# Patient Record
(demographics unavailable — no encounter records)

---

## 2025-02-23 NOTE — HISTORY OF PRESENT ILLNESS
[Mother] : mother [Fruit] : fruit [Vegetables] : vegetables [Meat] : meat [Dairy] : dairy [Normal] : Normal [Sippy cup use] : Sippy cup use [Bottle Use] : Bottle use [Brushing teeth] : Brushing teeth [Yes] : Patient goes to dentist yearly [None] : Primary Fluoride Source: None [No] : Not at  exposure [Water heater temperature set at <120 degrees F] : Water heater temperature set at <120 degrees F [Car seat in back seat] : Car seat in back seat [Smoke Detectors] : Smoke detectors [Supervised play near cars and streets] : Supervised play near cars and streets [Carbon Monoxide Detectors] : Carbon monoxide detectors [Up to date] : Up to date [NO] : No [FreeTextEntry3] : sleeps with mother [< 2 hrs of screen time] : Less than 2 hrs of screen time [Exposure to electronic nicotine delivery system] : No exposure to electronic nicotine delivery system [FreeTextEntry7] : [address] [facility] Saw ophthalmologist 10/24 for left eye exotropia-observation for now- follow up in April 2025, discussed possible surgery [de-identified] : mother expressed behavioral concerns that child does not always follow directions well or listen to her and is very active and has difficulty sitting still, has not started toilet training but plans to since he will be going to school in the Fall, thought he had possible speech delays last year but in last year is talking well and no further concerns [FreeTextEntry9] : will attend pre- K in the Fall [de-identified] : drinks Nido 8 oz before bed, still has bottle before bed

## 2025-02-23 NOTE — HISTORY OF PRESENT ILLNESS
[Mother] : mother [Fruit] : fruit [Vegetables] : vegetables [Meat] : meat [Dairy] : dairy [Normal] : Normal [Sippy cup use] : Sippy cup use [Bottle Use] : Bottle use [Brushing teeth] : Brushing teeth [Yes] : Patient goes to dentist yearly [None] : Primary Fluoride Source: None [No] : Not at  exposure [Water heater temperature set at <120 degrees F] : Water heater temperature set at <120 degrees F [Car seat in back seat] : Car seat in back seat [Smoke Detectors] : Smoke detectors [Supervised play near cars and streets] : Supervised play near cars and streets [Carbon Monoxide Detectors] : Carbon monoxide detectors [Up to date] : Up to date [NO] : No [FreeTextEntry3] : sleeps with mother [< 2 hrs of screen time] : Less than 2 hrs of screen time [Exposure to electronic nicotine delivery system] : No exposure to electronic nicotine delivery system [FreeTextEntry7] : 3 YR [facility] Saw ophthalmologist 10/24 for left eye exotropia-observation for now- follow up in April 2025, discussed possible surgery [de-identified] : mother expressed behavioral concerns that child does not always follow directions well or listen to her and is very active and has difficulty sitting still, has not started toilet training but plans to since he will be going to school in the Fall, thought he had possible speech delays last year but in last year is talking well and no further concerns [de-identified] : drinks Nido 8 oz before bed, still has bottle before bed [FreeTextEntry9] : will attend pre- K in the Fall

## 2025-02-23 NOTE — DEVELOPMENTAL MILESTONES
[Plays and shares with others] : plays and shares with others [Put on coat, jacket, or shirt by self] : puts on coat, jacket, or shirt by self [Eats independently] : eats independently [Uses 3-word sentences] : uses 3-word sentences [Uses words that are 75% intelligible] : uses words that are 75% intelligible to strangers [Understands simple prepositions] : understands simple prepositions [Climbs on and off couch] : climbs on and off couch or chair [Jumps forward] : jumps forward [Draws a single [place]] : draws a single [place] [Yes: _______] : yes, [unfilled] [Goes to the bathroom and urinates] : does not go to bathroom and urinates by self [Tells a story from a book or TV] : does not tell a story from a book or TV [Compares things using words such] : does not compare things using words such as bigger or shorter [Draws a person with head] : does not draw a person with head and one other body part [Cuts with child scissor] : does not cut with child scissor [FreeTextEntry1] : [place] not fully completed- possible fine motor delay, child not cooperative with questions on [place], mother states he can answer them when he wants to

## 2025-02-23 NOTE — HISTORY OF PRESENT ILLNESS
[Mother] : mother [Fruit] : fruit [Vegetables] : vegetables [Meat] : meat [Dairy] : dairy [Normal] : Normal [Sippy cup use] : Sippy cup use [Bottle Use] : Bottle use [Brushing teeth] : Brushing teeth [Yes] : Patient goes to dentist yearly [None] : Primary Fluoride Source: None [No] : Not at  exposure [Water heater temperature set at <120 degrees F] : Water heater temperature set at <120 degrees F [Car seat in back seat] : Car seat in back seat [Smoke Detectors] : Smoke detectors [Supervised play near cars and streets] : Supervised play near cars and streets [Carbon Monoxide Detectors] : Carbon monoxide detectors [Up to date] : Up to date [NO] : No [FreeTextEntry3] : sleeps with mother [< 2 hrs of screen time] : Less than 2 hrs of screen time [Exposure to electronic nicotine delivery system] : No exposure to electronic nicotine delivery system [FreeTextEntry7] : 3 YR [facility] Saw ophthalmologist 10/24 for left eye exotropia-observation for now- follow up in April 2025, discussed possible surgery [de-identified] : mother expressed behavioral concerns that child does not always follow directions well or listen to her and is very active and has difficulty sitting still, has not started toilet training but plans to since he will be going to school in the Fall, thought he had possible speech delays last year but in last year is talking well and no further concerns [de-identified] : drinks Nido 8 oz before bed, still has bottle before bed [FreeTextEntry9] : will attend pre- K in the Fall

## 2025-02-23 NOTE — DEVELOPMENTAL MILESTONES
[Plays and shares with others] : plays and shares with others [Put on coat, jacket, or shirt by self] : puts on coat, jacket, or shirt by self [Eats independently] : eats independently [Uses 3-word sentences] : uses 3-word sentences [Uses words that are 75% intelligible] : uses words that are 75% intelligible to strangers [Understands simple prepositions] : understands simple prepositions [Climbs on and off couch] : climbs on and off couch or chair [Jumps forward] : jumps forward [Draws a single [place]] : draws a single [place] [Yes: _______] : yes, [unfilled] [Goes to the bathroom and urinates] : does not go to bathroom and urinates by self [Tells a story from a book or TV] : does not tell a story from a book or TV [Compares things using words such] : does not compare things using words such as bigger or shorter [Draws a person with head] : does not draw a person with head and one other body part [Cuts with child scissor] : does not cut with child scissor [FreeTextEntry1] : [place] not fully completed- possible fine motor delay, child not cooperative with questions on [place], mother states he can answer them when he wants to

## 2025-02-23 NOTE — DISCUSSION/SUMMARY
[Normal Growth] : growth [None] : No known medical problems [No Elimination Concerns] : elimination [No Feeding Concerns] : feeding [No Skin Concerns] : skin [Normal Sleep Pattern] : sleep [Family Support] : family support [Encouraging Literacy Activities] : encouraging literacy activities [Playing with Peers] : playing with peers [Promoting Physical Activity] : promoting physical activity [Safety] : safety [Mother] : mother [FreeTextEntry1] : Continue balanced diet with all food groups. Brush teeth twice a day with toothbrush. Recommend visit to dentist. As per car seat 's guidelines, use forward-facing car seat in back seat of car. Switch to booster seat when child reaches highest weight/height for seat. Child needs to ride in a belt-positioning booster seat until  4 feet 9 inches has been reached and are between 8 and 12 years of age. Put toddler to sleep in own bed. Help toddler to maintain consistent daily routines and sleep schedule. Pre-K discussed. Ensure home is safe. Use consistent, positive discipline. Read aloud to toddler. Limit screen time to no more than 2 hours per day.  Vaccines UTD- flu vaccine declined  Lead exposure risk assessment reviewed  Oral health assessment reviewed  Uncooperative for OAE and vision screening  Discussed with mother behavioral concerns and developmental concerns (still has bottle before bed, no interest in toilet training, poor eye contact, fine motor skills) advised referral to neurologist for further evaluation of possible developmental issues, mother does not wish to pursue neurology evaluation and states child "is just having a bad day" Discussed referral to , [facility] for evaluation and mother agreed to meet with her and if she determines neurology evaluation is necessary then she have him seen by neurology  Return 1 year for routine well child check or sooner if any concerns

## 2025-02-23 NOTE — DEVELOPMENTAL MILESTONES
[Plays and shares with others] : plays and shares with others [Put on coat, jacket, or shirt by self] : puts on coat, jacket, or shirt by self [Eats independently] : eats independently [Uses 3-word sentences] : uses 3-word sentences [Uses words that are 75% intelligible] : uses words that are 75% intelligible to strangers [Understands simple prepositions] : understands simple prepositions [Climbs on and off couch] : climbs on and off couch or chair [Jumps forward] : jumps forward [Draws a single [place]] : draws a single [place] [Yes: _______] : yes, [unfilled] [Goes to the bathroom and urinates] : does not go to bathroom and urinates by self [Tells a story from a book or TV] : does not tell a story from a book or TV [Compares things using words such] : does not compare things using words such as bigger or shorter [Draws a person with head] : does not draw a person with head and one other body part [Cuts with child scissor] : does not cut with child scissor [FreeTextEntry1] : [place] not fully completed- possible fine motor delay, child not cooperative with questions on [place], mother states he can answer them when he wants to

## 2025-02-23 NOTE — DEVELOPMENTAL MILESTONES
[Plays and shares with others] : plays and shares with others [Put on coat, jacket, or shirt by self] : puts on coat, jacket, or shirt by self [Eats independently] : eats independently [Uses 3-word sentences] : uses 3-word sentences [Uses words that are 75% intelligible] : uses words that are 75% intelligible to strangers [Understands simple prepositions] : understands simple prepositions [Climbs on and off couch] : climbs on and off couch or chair [Jumps forward] : jumps forward [Draws a single [place]] : draws a single [place] [Yes: _______] : yes, [unfilled] [Goes to the bathroom and urinates] : does not go to bathroom and urinates by self [Tells a story from a book or TV] : does not tell a story from a book or TV [Compares things using words such] : does not compare things using words such as bigger or shorter [Draws a person with head] : does not draw a person with head and one other body part [Cuts with child scissor] : does not cut with child scissor [FreeTextEntry1] : [place] not fully completed- possible fine motor delay, child not cooperative with questions on [place], mother states he can answer them when he wants to

## 2025-02-23 NOTE — PHYSICAL EXAM
[Alert] : alert [No Acute Distress] : no acute distress [Playful] : playful [Normocephalic] : normocephalic [Conjunctivae with no discharge] : conjunctivae with no discharge [PERRL] : PERRL [EOMI Bilateral] : EOMI bilateral [Auricles Well Formed] : auricles well formed [Clear Tympanic membranes with present light reflex and bony landmarks] : clear tympanic membranes with present light reflex and bony landmarks [No Discharge] : no discharge [Nares Patent] : nares patent [Pink Nasal Mucosa] : pink nasal mucosa [Palate Intact] : palate intact [Uvula Midline] : uvula midline [Nonerythematous Oropharynx] : nonerythematous oropharynx [No Caries] : no caries [Trachea Midline] : trachea midline [Supple, full passive range of motion] : supple, full passive range of motion [No Palpable Masses] : no palpable masses [Symmetric Chest Rise] : symmetric chest rise [Clear to Auscultation Bilaterally] : clear to auscultation bilaterally [Normoactive Precordium] : normoactive precordium [Regular Rate and Rhythm] : regular rate and rhythm [Normal S1, S2 present] : normal S1, S2 present [No Murmurs] : no murmurs [+2 Femoral Pulses] : +2 femoral pulses [Soft] : soft [NonTender] : non tender [Non Distended] : non distended [Normoactive Bowel Sounds] : normoactive bowel sounds [No Hepatomegaly] : no hepatomegaly [No Splenomegaly] : no splenomegaly [Fish 1] : Fish 1 [Central Urethral Opening] : central urethral opening [Testicles Descended Bilaterally] : testicles descended bilaterally [Patent] : patent [Normally Placed] : normally placed [No Abnormal Lymph Nodes Palpated] : no abnormal lymph nodes palpated [Symmetric Buttocks Creases] : symmetric buttocks creases [Symmetric Hip Rotation] : symmetric hip rotation [No Gait Asymmetry] : no gait asymmetry [No pain or deformities with palpation of bone, muscles, joints] : no pain or deformities with palpation of bone, muscles, joints [Normal Muscle Tone] : normal muscle tone [No Spinal Dimple] : no spinal dimple [NoTuft of Hair] : no tuft of hair [Straight] : straight [+2 Patella DTR] : +2 patella DTR [Cranial Nerves Grossly Intact] : cranial nerves grossly intact [No Rash or Lesions] : no rash or lesions [FreeTextEntry1] : limited examination due to cooperation, poor eye contact, crying [FreeTextEntry5] : left eye exotropia

## 2025-02-23 NOTE — HISTORY OF PRESENT ILLNESS
[Mother] : mother [Fruit] : fruit [Vegetables] : vegetables [Meat] : meat [Dairy] : dairy [Normal] : Normal [Sippy cup use] : Sippy cup use [Bottle Use] : Bottle use [Brushing teeth] : Brushing teeth [Yes] : Patient goes to dentist yearly [None] : Primary Fluoride Source: None [No] : Not at  exposure [Water heater temperature set at <120 degrees F] : Water heater temperature set at <120 degrees F [Car seat in back seat] : Car seat in back seat [Smoke Detectors] : Smoke detectors [Supervised play near cars and streets] : Supervised play near cars and streets [Carbon Monoxide Detectors] : Carbon monoxide detectors [Up to date] : Up to date [NO] : No [FreeTextEntry3] : sleeps with mother [< 2 hrs of screen time] : Less than 2 hrs of screen time [Exposure to electronic nicotine delivery system] : No exposure to electronic nicotine delivery system [FreeTextEntry7] : 3 YR [facility] Saw ophthalmologist 10/24 for left eye exotropia-observation for now- follow up in April 2025, discussed possible surgery [de-identified] : mother expressed behavioral concerns that child does not always follow directions well or listen to her and is very active and has difficulty sitting still, has not started toilet training but plans to since he will be going to school in the Fall, thought he had possible speech delays last year but in last year is talking well and no further concerns [FreeTextEntry9] : will attend pre- K in the Fall [de-identified] : drinks Nido 8 oz before bed, still has bottle before bed

## 2025-04-01 NOTE — PLAN
[TextEntry] : CROUP PLAN: - Treatment with medication per order - Reviewed cough supportive care - Discussed use of cool air exposure and humidity in the treatment of croup - Follow up if symptoms are worsening  VIRAL INFECTION: - Discussed viral etiology and rationale for treatment - Maintain hydration, make sure your child drinks small sips of fluids throughout the day. It's normal that they're not eating like they usually do, but hydration is key - Symptomatic treatment with acetaminophen or ibuprofen prn - Saline nose drops, cool mist humidifier  - Supportive care with fluids and rest - Follow up if symptoms are worsening

## 2025-04-01 NOTE — HISTORY OF PRESENT ILLNESS
[de-identified] : Mom reports pt was seen at [facility] Saturday. Dx viral. Mom wants rechecked. Afebrile on Saturday but not since per mom. Threw up milk x2 yesterday. Decreased appetite and voiding at baseline. Dad brought pt to [facility] but dad relayed all testing was negative per mom. [FreeTextEntry6] : In addition to above: Cough started yesterday, sounds "bad" Had milk this AM and was able to keep it down No diarrhea

## 2025-04-01 NOTE — CARE PLAN
[Care Plan reviewed and provided to patient/caregiver] : Care plan reviewed and provided to patient/caregiver [Care Plan reviewed every ___ weeks] : Care plan reviewed every [unfilled] weeks [Care Plan managed/Care coordinated by: ___] : Care plan managed/Care coordinated by: [unfilled] [Understands and communicates without difficulty] : Patient/Caregiver understands and communicates without difficulty [FreeTextEntry2] : improved cough [FreeTextEntry3] : see plan